# Patient Record
Sex: MALE | Race: WHITE | NOT HISPANIC OR LATINO | Employment: UNEMPLOYED | URBAN - NONMETROPOLITAN AREA
[De-identification: names, ages, dates, MRNs, and addresses within clinical notes are randomized per-mention and may not be internally consistent; named-entity substitution may affect disease eponyms.]

---

## 2019-02-09 ENCOUNTER — APPOINTMENT (OUTPATIENT)
Dept: GENERAL RADIOLOGY | Facility: HOSPITAL | Age: 44
End: 2019-02-09

## 2019-02-09 ENCOUNTER — HOSPITAL ENCOUNTER (EMERGENCY)
Facility: HOSPITAL | Age: 44
Discharge: HOME | End: 2019-02-09
Attending: EMERGENCY MEDICINE | Admitting: EMERGENCY MEDICINE

## 2019-02-09 VITALS
OXYGEN SATURATION: 97 % | BODY MASS INDEX: 24.15 KG/M2 | SYSTOLIC BLOOD PRESSURE: 100 MMHG | HEART RATE: 116 BPM | TEMPERATURE: 98.7 F | HEIGHT: 70 IN | DIASTOLIC BLOOD PRESSURE: 87 MMHG | WEIGHT: 168.7 LBS | RESPIRATION RATE: 19 BRPM

## 2019-02-09 DIAGNOSIS — F15.10 METHAMPHETAMINE ABUSE (HCC): Primary | ICD-10-CM

## 2019-02-09 LAB
ALBUMIN SERPL-MCNC: 5.2 G/DL (ref 3.5–5)
ALBUMIN/GLOB SERPL: 1.5 G/DL (ref 1.1–2.5)
ALP SERPL-CCNC: 88 U/L (ref 24–120)
ALT SERPL W P-5'-P-CCNC: 28 U/L (ref 0–54)
ANION GAP SERPL CALCULATED.3IONS-SCNC: 11 MMOL/L (ref 4–13)
AST SERPL-CCNC: 48 U/L (ref 7–45)
BASOPHILS # BLD AUTO: 0.05 10*3/MM3 (ref 0–0.2)
BASOPHILS NFR BLD AUTO: 0.3 % (ref 0–2)
BILIRUB SERPL-MCNC: 1.1 MG/DL (ref 0.1–1)
BUN BLD-MCNC: 25 MG/DL (ref 5–21)
BUN/CREAT SERPL: 22.7 (ref 7–25)
CALCIUM SPEC-SCNC: 9.7 MG/DL (ref 8.4–10.4)
CHLORIDE SERPL-SCNC: 102 MMOL/L (ref 98–110)
CO2 SERPL-SCNC: 27 MMOL/L (ref 24–31)
CREAT BLD-MCNC: 1.1 MG/DL (ref 0.5–1.4)
DEPRECATED RDW RBC AUTO: 38.4 FL (ref 40–54)
EOSINOPHIL # BLD AUTO: 0.04 10*3/MM3 (ref 0–0.7)
EOSINOPHIL NFR BLD AUTO: 0.3 % (ref 0–4)
ERYTHROCYTE [DISTWIDTH] IN BLOOD BY AUTOMATED COUNT: 12.2 % (ref 12–15)
GFR SERPL CREATININE-BSD FRML MDRD: 74 ML/MIN/1.73
GLOBULIN UR ELPH-MCNC: 3.5 GM/DL
GLUCOSE BLD-MCNC: 76 MG/DL (ref 70–100)
HCT VFR BLD AUTO: 40.3 % (ref 40–52)
HGB BLD-MCNC: 14.2 G/DL (ref 14–18)
IMM GRANULOCYTES # BLD AUTO: 0.04 10*3/MM3 (ref 0–0.03)
IMM GRANULOCYTES NFR BLD AUTO: 0.3 % (ref 0–5)
LYMPHOCYTES # BLD AUTO: 1.46 10*3/MM3 (ref 0.72–4.86)
LYMPHOCYTES NFR BLD AUTO: 9.1 % (ref 15–45)
MCH RBC QN AUTO: 30.4 PG (ref 28–32)
MCHC RBC AUTO-ENTMCNC: 35.2 G/DL (ref 33–36)
MCV RBC AUTO: 86.3 FL (ref 82–95)
MONOCYTES # BLD AUTO: 0.8 10*3/MM3 (ref 0.19–1.3)
MONOCYTES NFR BLD AUTO: 5 % (ref 4–12)
NEUTROPHILS # BLD AUTO: 13.59 10*3/MM3 (ref 1.87–8.4)
NEUTROPHILS NFR BLD AUTO: 85 % (ref 39–78)
NRBC BLD AUTO-RTO: 0 /100 WBC (ref 0–0)
PLATELET # BLD AUTO: 297 10*3/MM3 (ref 130–400)
PMV BLD AUTO: 9 FL (ref 6–12)
POTASSIUM BLD-SCNC: 4.3 MMOL/L (ref 3.5–5.3)
PROT SERPL-MCNC: 8.7 G/DL (ref 6.3–8.7)
RBC # BLD AUTO: 4.67 10*6/MM3 (ref 4.8–5.9)
SODIUM BLD-SCNC: 140 MMOL/L (ref 135–145)
TROPONIN I SERPL-MCNC: <0.012 NG/ML (ref 0–0.03)
WBC NRBC COR # BLD: 15.98 10*3/MM3 (ref 4.8–10.8)

## 2019-02-09 PROCEDURE — 93005 ELECTROCARDIOGRAM TRACING: CPT | Performed by: EMERGENCY MEDICINE

## 2019-02-09 PROCEDURE — 80053 COMPREHEN METABOLIC PANEL: CPT | Performed by: EMERGENCY MEDICINE

## 2019-02-09 PROCEDURE — 99284 EMERGENCY DEPT VISIT MOD MDM: CPT

## 2019-02-09 PROCEDURE — 84484 ASSAY OF TROPONIN QUANT: CPT | Performed by: EMERGENCY MEDICINE

## 2019-02-09 PROCEDURE — 85025 COMPLETE CBC W/AUTO DIFF WBC: CPT | Performed by: EMERGENCY MEDICINE

## 2019-02-09 PROCEDURE — 25010000002 LORAZEPAM PER 2 MG: Performed by: EMERGENCY MEDICINE

## 2019-02-09 PROCEDURE — 96374 THER/PROPH/DIAG INJ IV PUSH: CPT

## 2019-02-09 PROCEDURE — 96376 TX/PRO/DX INJ SAME DRUG ADON: CPT

## 2019-02-09 PROCEDURE — 93010 ELECTROCARDIOGRAM REPORT: CPT | Performed by: INTERNAL MEDICINE

## 2019-02-09 PROCEDURE — 73110 X-RAY EXAM OF WRIST: CPT

## 2019-02-09 RX ORDER — LORAZEPAM 2 MG/ML
1 INJECTION INTRAMUSCULAR ONCE
Status: COMPLETED | OUTPATIENT
Start: 2019-02-09 | End: 2019-02-09

## 2019-02-09 RX ORDER — SODIUM CHLORIDE 0.9 % (FLUSH) 0.9 %
10 SYRINGE (ML) INJECTION AS NEEDED
Status: DISCONTINUED | OUTPATIENT
Start: 2019-02-09 | End: 2019-02-09 | Stop reason: HOSPADM

## 2019-02-09 RX ADMIN — LORAZEPAM 1 MG: 2 INJECTION INTRAMUSCULAR at 15:33

## 2019-02-09 RX ADMIN — SODIUM CHLORIDE 1000 ML: 9 INJECTION, SOLUTION INTRAVENOUS at 17:44

## 2019-02-09 RX ADMIN — LORAZEPAM 1 MG: 2 INJECTION INTRAMUSCULAR at 16:50

## 2019-02-09 RX ADMIN — SODIUM CHLORIDE 1000 ML: 9 INJECTION, SOLUTION INTRAVENOUS at 15:32

## 2019-02-09 NOTE — ED PROVIDER NOTES
Subjective   Patient is a 49-year-old male who presents for medical clearance by law enforcement.  Patient was picked up with his girlfriend for walking in traffic.  Patient admitted to 1 g of methamphetamine use about 2 hours ago.  Patient states he eats methamphetamine.  Denies any other drug or alcohol use.  Denies chest pain, shortness of breath.  He does endorse 5 months worth of left wrist pain.  Denies trauma.  No fevers.  Denies any other complaint.  He is cooperative in no acute distress.            Review of Systems   Constitutional: Negative for fever.   HENT: Negative for sore throat.    Eyes: Negative for visual disturbance.   Respiratory: Negative for shortness of breath.    Cardiovascular: Negative for chest pain.   Gastrointestinal: Negative for abdominal pain.   Genitourinary: Negative for hematuria.   Musculoskeletal: Negative for back pain.   Skin: Negative for rash.   Neurological: Negative for headaches.   Psychiatric/Behavioral: Negative for agitation, confusion, hallucinations, self-injury, sleep disturbance and suicidal ideas. The patient is not nervous/anxious.        History reviewed. No pertinent past medical history.    No Known Allergies    History reviewed. No pertinent surgical history.    History reviewed. No pertinent family history.    Social History     Socioeconomic History   • Marital status:      Spouse name: Not on file   • Number of children: Not on file   • Years of education: Not on file   • Highest education level: Not on file   Tobacco Use   • Smoking status: Never Smoker   Substance and Sexual Activity   • Alcohol use: Yes     Comment: occassional beer   • Drug use: Yes     Types: Methamphetamines     Comment: meth today - ingestion   • Sexual activity: Defer       Lab Results (last 24 hours)     Procedure Component Value Units Date/Time    CBC & Differential [[id number]] Collected:  02/09/19 1530    Specimen:  Blood Updated:  02/09/19 1550    Narrative:       The  following orders were created for panel order CBC & Differential.  Procedure                               Abnormality         Status                     ---------                               -----------         ------                     CBC Auto Differential[[id number]]        Abnormal            Final result                 Please view results for these tests on the individual orders.    Comprehensive Metabolic Panel [[id number]]  (Abnormal) Collected:  02/09/19 1530    Specimen:  Blood from Arm, Left Updated:  02/09/19 1558     Glucose 76 mg/dL      BUN 25 mg/dL      Creatinine 1.10 mg/dL      Sodium 140 mmol/L      Potassium 4.3 mmol/L      Chloride 102 mmol/L      CO2 27.0 mmol/L      Calcium 9.7 mg/dL      Total Protein 8.7 g/dL      Albumin 5.20 g/dL      ALT (SGPT) 28 U/L      AST (SGOT) 48 U/L      Alkaline Phosphatase 88 U/L      Total Bilirubin 1.1 mg/dL      eGFR Non African Amer 74 mL/min/1.73      Globulin 3.5 gm/dL      A/G Ratio 1.5 g/dL      BUN/Creatinine Ratio 22.7     Anion Gap 11.0 mmol/L     Troponin [[id number]]  (Normal) Collected:  02/09/19 1530    Specimen:  Blood from Arm, Left Updated:  02/09/19 1608     Troponin I <0.012 ng/mL     CBC Auto Differential [[id number]]  (Abnormal) Collected:  02/09/19 1530    Specimen:  Blood from Arm, Left Updated:  02/09/19 1550     WBC 15.98 10*3/mm3      RBC 4.67 10*6/mm3      Hemoglobin 14.2 g/dL      Hematocrit 40.3 %      MCV 86.3 fL      MCH 30.4 pg      MCHC 35.2 g/dL      RDW 12.2 %      RDW-SD 38.4 fl      MPV 9.0 fL      Platelets 297 10*3/mm3      Neutrophil % 85.0 %      Lymphocyte % 9.1 %      Monocyte % 5.0 %      Eosinophil % 0.3 %      Basophil % 0.3 %      Immature Grans % 0.3 %      Neutrophils, Absolute 13.59 10*3/mm3      Lymphocytes, Absolute 1.46 10*3/mm3      Monocytes, Absolute 0.80 10*3/mm3      Eosinophils, Absolute 0.04 10*3/mm3      Basophils, Absolute 0.05 10*3/mm3      Immature Grans, Absolute 0.04 10*3/mm3      nRBC 0.0 /100  "WBC           Objective   Physical Exam   Constitutional: He is oriented to person, place, and time. He appears well-developed and well-nourished. He is cooperative.  Non-toxic appearance. He does not appear ill. No distress.   HENT:   Head: Atraumatic.   Mouth/Throat: Oropharynx is clear and moist and mucous membranes are normal.   Eyes: EOM are normal. Pupils are equal, round, and reactive to light.   Neck: Normal range of motion. Neck supple.   Cardiovascular: Regular rhythm, normal heart sounds and intact distal pulses. Tachycardia present. Exam reveals no gallop and no friction rub.   No murmur heard.  Pulmonary/Chest: Effort normal and breath sounds normal. No respiratory distress. He has no wheezes. He has no rhonchi. He has no rales. He exhibits no tenderness.   Abdominal: Soft. There is no tenderness.   Neurological: He is alert and oriented to person, place, and time. No cranial nerve deficit.   Skin: Skin is warm and dry. Capillary refill takes less than 2 seconds. He is not diaphoretic.   Psychiatric: He has a normal mood and affect. His speech is normal and behavior is normal. Thought content normal. He is not actively hallucinating.   Nursing note and vitals reviewed.      Procedures         XR Wrist 3+ View Left   Final Result   . Essentially normal exam of the left wrist with no evidence   of fracture or dislocation.   This report was finalized on 02/09/2019 15:33 by Dr. Hal Heck MD.          /73   Pulse 115   Temp 98.7 °F (37.1 °C)   Resp 18   Ht 177.8 cm (70\")   Wt 76.5 kg (168 lb 11.2 oz)   SpO2 97%   BMI 24.21 kg/m²     ED Course    ED Course as of Feb 09 1820   Sat Feb 09, 2019   1516 EKG sinus tachycardia.  Rate 127.  MA interval 142, QRS 82, QTc 450.  No elevation in aVR.  [TH]   1602 This is a 43-year-old male with methamphetamine abuse.  Patient arrives tachycardic by law enforcement.  Patient well-appearing.  No SI, HI, hallucinations.  His blood work does show " leukocytosis but likely reactive.  Labs otherwise benign.  Patient received Ativan.  His EKG was sinus tachycardia.  Chronic left wrist pain but x-ray negative.  Also gave patient 1 L of fluids.  [TH]   1738 Patient continued to be tachycardic therefore gave additional dose of Ativan.  [TH]   1812 Heart rate improved to 112.  Patient is requesting to go home and we will discharge patient home.  He is not altered, he does have the capacity to make decisions.  Tachycardia consistent with methamphetamine abuse.  Discussed with patient discontinuing drugs.  [TH]   1818 Apparently law enforcement is not going to arrest him.  We will discharge him home.  [TH]      ED Course User Index  [TH] Mumtaz Izquierdo MD       Medications   sodium chloride 0.9 % flush 10 mL (not administered)   sodium chloride 0.9 % bolus 1,000 mL (0 mL Intravenous Stopped 2/9/19 1547)   LORazepam (ATIVAN) injection 1 mg (1 mg Intravenous Given 2/9/19 1533)   LORazepam (ATIVAN) injection 1 mg (1 mg Intravenous Given 2/9/19 1650)   sodium chloride 0.9 % bolus 1,000 mL (1,000 mL Intravenous New Bag 2/9/19 1744)            MDM  Number of Diagnoses or Management Options  Methamphetamine abuse (CMS/HCC): new and requires workup     Amount and/or Complexity of Data Reviewed  Clinical lab tests: ordered and reviewed    Risk of Complications, Morbidity, and/or Mortality  Presenting problems: moderate  Diagnostic procedures: low  Management options: low    Patient Progress  Patient progress: improved      Final diagnoses:   Methamphetamine abuse (CMS/HCC)          Mumtaz Izquierdo MD  02/09/19 8161

## 2019-12-19 ENCOUNTER — OFFICE VISIT (OUTPATIENT)
Dept: GASTROENTEROLOGY | Facility: CLINIC | Age: 44
End: 2019-12-19

## 2019-12-19 VITALS
BODY MASS INDEX: 23.62 KG/M2 | WEIGHT: 165 LBS | DIASTOLIC BLOOD PRESSURE: 70 MMHG | HEART RATE: 66 BPM | HEIGHT: 70 IN | OXYGEN SATURATION: 99 % | TEMPERATURE: 98 F | SYSTOLIC BLOOD PRESSURE: 120 MMHG

## 2019-12-19 DIAGNOSIS — K62.5 RECTAL BLEED: Primary | ICD-10-CM

## 2019-12-19 DIAGNOSIS — K21.9 GASTROESOPHAGEAL REFLUX DISEASE, ESOPHAGITIS PRESENCE NOT SPECIFIED: ICD-10-CM

## 2019-12-19 PROCEDURE — 99204 OFFICE O/P NEW MOD 45 MIN: CPT | Performed by: NURSE PRACTITIONER

## 2019-12-19 NOTE — PROGRESS NOTES
Chief Complaint   Patient presents with   • Colonoscopy     having dark stools been having several years never had endo       PCP: Angelia Urena APRN  REFER: Angelia Urena APRN    Subjective     HPI    Patient referred to office for complain of dark stools recurrent for over five years.  Bowels move on daily basis.  He has observed bright red blood per rectum.  No diarrhea or constipation.  No abdominal pain.  No nausea/vomitting.  Occasional heartburn and indigestion that has been recurrent x 5 years.  Denies frequent use of NSAIDs.  No weight loss.  Frequent emesis.   He is unable to identify triggering factor to emesis.  No dysphagia.  No previous colonoscopy or egd.     Past Medical History:   Diagnosis Date   • Depression        History reviewed. No pertinent surgical history.    Outpatient Medications Marked as Taking for the 12/19/19 encounter (Office Visit) with Ganesh Rosenberg APRN   Medication Sig Dispense Refill   • sertraline (ZOLOFT) 50 MG tablet Take 50 mg by mouth Daily.         No Known Allergies    Social History     Socioeconomic History   • Marital status:      Spouse name: Not on file   • Number of children: Not on file   • Years of education: Not on file   • Highest education level: Not on file   Tobacco Use   • Smoking status: Never Smoker   • Smokeless tobacco: Never Used   Substance and Sexual Activity   • Alcohol use: Yes     Comment: occassional beer   • Drug use: Not Currently     Comment: meth today - ingestion   • Sexual activity: Defer       Family History   Family history unknown: Yes       Review of Systems   Constitutional: Negative for fatigue, fever and unexpected weight change.   HENT: Negative for hearing loss, sore throat and voice change.    Eyes: Negative for visual disturbance.   Respiratory: Negative for cough, shortness of breath and wheezing.    Cardiovascular: Negative for chest pain and palpitations.   Gastrointestinal: Positive for anal  "bleeding and vomiting. Negative for abdominal pain and blood in stool.   Endocrine: Negative for polydipsia and polyuria.   Genitourinary: Negative for difficulty urinating, dysuria, hematuria and urgency.   Musculoskeletal: Negative for joint swelling and myalgias.   Skin: Negative for color change, rash and wound.   Neurological: Negative for dizziness, tremors, seizures and syncope.   Hematological: Does not bruise/bleed easily.   Psychiatric/Behavioral: Negative for agitation and confusion. The patient is not nervous/anxious.        Objective     Vitals:    12/19/19 0914   BP: 120/70   Pulse: 66   Temp: 98 °F (36.7 °C)   SpO2: 99%   Weight: 74.8 kg (165 lb)   Height: 177.8 cm (70\")     Body mass index is 23.68 kg/m².    Physical Exam   Constitutional: He is oriented to person, place, and time. He appears well-developed and well-nourished. He is cooperative.   HENT:   Head: Normocephalic and atraumatic.   Eyes: Pupils are equal, round, and reactive to light. Conjunctivae are normal. No scleral icterus.   Neck: Normal range of motion. Neck supple. No JVD present. No thyroid mass and no thyromegaly present.   Cardiovascular: Normal rate, regular rhythm and normal heart sounds. Exam reveals no gallop and no friction rub.   No murmur heard.  Pulmonary/Chest: Effort normal and breath sounds normal. No accessory muscle usage. No respiratory distress. He has no wheezes. He has no rales.   Abdominal: Soft. Normal appearance and bowel sounds are normal. He exhibits no distension, no ascites and no mass. There is no hepatosplenomegaly. There is no tenderness. There is no rebound and no guarding.   Musculoskeletal: Normal range of motion. He exhibits no edema or tenderness.     Vascular Status -  His right foot exhibits normal foot vasculature  and no edema. His left foot exhibits normal foot vasculature  and no edema.  Lymphadenopathy:     He has no cervical adenopathy.   Neurological: He is alert and oriented to person, " place, and time. He has normal strength. Gait normal.   Skin: Skin is warm, dry and intact. No rash noted.       Imaging Results (Most Recent)     None          Body mass index is 23.68 kg/m².    Assessment/Plan     Dayton was seen today for colonoscopy.    Diagnoses and all orders for this visit:    Rectal bleed  -     Case Request; Standing  -     Case Request    Gastroesophageal reflux disease, esophagitis presence not specified  -     Case Request; Standing  -     Case Request        ESOPHAGOGASTRODUODENOSCOPY WITH ANESTHESIA (N/A)  2. COLONOSCOPY WITH ANESTHESIA    The risk of the endoscopy were discussed in detail.  We discussed the risk of perforation (one out of 3848-5247, riskier with dilation), bleeding (one out of 500), and the rare risks of infection, adverse reaction to anesthesia, respiratory failure, cardiac failure including MI and adverse reaction to medications, etc.  We discussed consequences that could occur if a risk were to develop such as the need for hospitalization, blood transfusion, surgical intervention, medications, pain and disability and death.  Alternatives include not doing anything, or pursuing an UGI series which only offers a diagnosis with potential less accuracy compared to egd.  The patient verbalizes understanding and agrees to proceed.      All risks, benefits, alternatives, and indications of colonoscopy procedure have been discussed with the patient. Risks to include perforation of the colon requiring possible surgery or colostomy, risk of bleeding from biopsies or removal of colon tissue, possibility of missing a colon polyp or cancer, or adverse drug reaction.  Benefits to include the diagnosis and management of disease of the colon and rectum. Alternatives to include barium enema, radiographic evaluation, lab testing or no intervention. Pt verbalizes understanding and agrees to proceed with procedure.        There are no Patient Instructions on file for this visit.

## 2019-12-23 PROBLEM — K21.9 GASTROESOPHAGEAL REFLUX DISEASE: Status: ACTIVE | Noted: 2019-12-23

## 2020-01-02 ENCOUNTER — TELEPHONE (OUTPATIENT)
Dept: GASTROENTEROLOGY | Facility: HOSPITAL | Age: 45
End: 2020-01-02

## 2020-01-02 NOTE — TELEPHONE ENCOUNTER
Left message on patient cell phone. Sent letter to pcp.      Repeat urine specimen obtained, bright red blood noted in previous urine sample no longer noted, however urine specimen still appears dark.

## 2020-01-07 ENCOUNTER — TELEPHONE (OUTPATIENT)
Dept: GASTROENTEROLOGY | Facility: HOSPITAL | Age: 45
End: 2020-01-07

## 2020-01-07 NOTE — TELEPHONE ENCOUNTER
Sent letter pcp and left message for patient to contact the office to make appointment with Dr. Swanson.     Thank you

## 2020-07-30 ENCOUNTER — TELEMEDICINE (OUTPATIENT)
Dept: GASTROENTEROLOGY | Facility: CLINIC | Age: 45
End: 2020-07-30

## 2020-07-30 DIAGNOSIS — R19.8 ALTERED BOWEL FUNCTION: ICD-10-CM

## 2020-07-30 DIAGNOSIS — K62.5 RECTAL BLEED: Primary | ICD-10-CM

## 2020-07-30 PROCEDURE — 99213 OFFICE O/P EST LOW 20 MIN: CPT | Performed by: NURSE PRACTITIONER

## 2020-07-30 NOTE — PROGRESS NOTES
Chief Complaint   Patient presents with   • Colonoscopy     needs colon       PCP: Angelia Urena APRN  REFER: No ref. provider found    Subjective     HPI    VIDEO VISIT:    Patient complain of fecal leakage.  This has been occurring on intermittent basis for over one year.  He does experience fecal urgency within 30 min of eating.  Urgency not always associated with  greasy food.  Abdominal cramping present  prior to having a BM.  No constipation.  Occasional bright red blood, no melena.  Blood not related to hard stool or diarrheal type stool.  Denies frequent use of NSAIDs.  No weight loss.  No previous colonoscopy.     Past Medical History:   Diagnosis Date   • Depression        History reviewed. No pertinent surgical history.    No outpatient medications have been marked as taking for the 7/30/20 encounter (Telemedicine) with Ganesh Rosenberg APRN.       No Known Allergies    Social History     Socioeconomic History   • Marital status:      Spouse name: Not on file   • Number of children: Not on file   • Years of education: Not on file   • Highest education level: Not on file   Tobacco Use   • Smoking status: Never Smoker   • Smokeless tobacco: Never Used   Substance and Sexual Activity   • Alcohol use: Not Currently   • Drug use: Not Currently     Comment: meth today - ingestion   • Sexual activity: Defer       Family History   Problem Relation Age of Onset   • Colon cancer Neg Hx    • Colon polyps Neg Hx    • Esophageal cancer Neg Hx        Review of Systems   Constitutional: Negative for fatigue, fever and unexpected weight change.   HENT: Negative for hearing loss, sore throat and voice change.    Eyes: Negative for visual disturbance.   Respiratory: Negative for cough, shortness of breath and wheezing.    Cardiovascular: Negative for chest pain and palpitations.   Gastrointestinal: Positive for anal bleeding. Negative for abdominal pain, blood in stool and vomiting.   Endocrine: Negative  for polydipsia and polyuria.   Genitourinary: Negative for difficulty urinating, dysuria, hematuria and urgency.   Musculoskeletal: Negative for joint swelling and myalgias.   Skin: Negative for color change, rash and wound.   Neurological: Negative for dizziness, tremors, seizures and syncope.   Hematological: Does not bruise/bleed easily.   Psychiatric/Behavioral: Negative for agitation and confusion. The patient is not nervous/anxious.        Objective     There were no vitals filed for this visit.  There is no height or weight on file to calculate BMI.    Physical Exam   Constitutional: He appears well-developed and well-nourished.   HENT:   Head: Atraumatic.   Eyes: Conjunctivae and EOM are normal. Right eye exhibits no discharge. Left eye exhibits no discharge. No scleral icterus.   Neck: Neck normal appearance.  Pulmonary/Chest: Effort normal.   Abdominal: Abdomen appears normal.   Musculoskeletal: Normal range of motion.   Neurological: He is alert.   Skin: Skin is dry. No pallor.   Psychiatric: He has a normal mood and affect.       Imaging Results (Most Recent)     None          There is no height or weight on file to calculate BMI.    Assessment/Plan     Dayton was seen today for colonoscopy.    Diagnoses and all orders for this visit:    Rectal bleed    Altered bowel function  -     Case Request; Standing  -     Case Request    Other orders  -     polyethylene glycol (GoLYTELY) 236 g solution; Take 3,785 mL by mouth 1 (One) Time for 1 dose. Take as directed        COLONOSCOPY WITH ANESTHESIA (N/A)    Decrease caffeine intake      All risks, benefits, alternatives, and indications of colonoscopy procedure have been discussed with the patient. Risks to include perforation of the colon requiring possible surgery or colostomy, risk of bleeding from biopsies or removal of colon tissue, possibility of missing a colon polyp or cancer, or adverse drug reaction.  Benefits to include the diagnosis and management of  disease of the colon and rectum. Alternatives to include barium enema, radiographic evaluation, lab testing or no intervention. Pt verbalizes understanding and agrees to proceed with procedure.      Precautions are currently being put in place due to COVID-19.  I have explained to Dayton Wheeler they will be required to undergo COVID testing prior to their procedure.  Dayton Wheeler verbalized understanding and was willing to proceed.      This was an audio and video enabled telemedicine encounter. This visit was conducted with me in my office and Dayton Wheeler at their home    Ganesh Rosenberg, APRN  07/30/20          There are no Patient Instructions on file for this visit.

## 2020-07-30 NOTE — PROGRESS NOTES
No chief complaint on file.      PCP: Angelia Urena APRN  REFER: No ref. provider found    Subjective     HPI        Past Medical History:   Diagnosis Date   • Depression        No past surgical history on file.    No outpatient medications have been marked as taking for the 7/30/20 encounter (Appointment) with Ganesh Rosenberg APRN.       No Known Allergies    Social History     Socioeconomic History   • Marital status:      Spouse name: Not on file   • Number of children: Not on file   • Years of education: Not on file   • Highest education level: Not on file   Tobacco Use   • Smoking status: Never Smoker   • Smokeless tobacco: Never Used   Substance and Sexual Activity   • Alcohol use: Yes     Comment: occassional beer   • Drug use: Not Currently     Comment: meth today - ingestion   • Sexual activity: Defer       Family History   Family history unknown: Yes       Review of Systems   Constitutional: Negative for fatigue, fever and unexpected weight change.   HENT: Negative for hearing loss, sore throat and voice change.    Eyes: Negative for visual disturbance.   Respiratory: Negative for cough, shortness of breath and wheezing.    Cardiovascular: Negative for chest pain and palpitations.   Gastrointestinal: Negative for abdominal pain, blood in stool and vomiting.   Endocrine: Negative for polydipsia and polyuria.   Genitourinary: Negative for difficulty urinating, dysuria, hematuria and urgency.   Musculoskeletal: Negative for joint swelling and myalgias.   Skin: Negative for color change, rash and wound.   Neurological: Negative for dizziness, tremors, seizures and syncope.   Hematological: Does not bruise/bleed easily.   Psychiatric/Behavioral: Negative for agitation and confusion. The patient is not nervous/anxious.        Objective     There were no vitals filed for this visit.  There is no height or weight on file to calculate BMI.    Physical Exam    Imaging Results (Most Recent)      None          There is no height or weight on file to calculate BMI.    Assessment/Plan     There are no diagnoses linked to this encounter.    * Surgery not found *        Precautions are currently being put in place due to COVID-19.  I have explained to Dayton Wheeler they will be required to undergo COVID testing prior to their procedure.  Dayton Wheeler verbalized understanding and was willing to proceed.    Patient's There is no height or weight on file to calculate BMI. BMI is {BMI range:25265}.       Ganesh Rosenberg, APRN  07/30/20          There are no Patient Instructions on file for this visit.

## 2020-07-31 ENCOUNTER — HOSPITAL ENCOUNTER (OUTPATIENT)
Facility: HOSPITAL | Age: 45
Setting detail: HOSPITAL OUTPATIENT SURGERY
End: 2020-07-31
Attending: INTERNAL MEDICINE | Admitting: INTERNAL MEDICINE

## 2020-07-31 PROBLEM — R19.8 ALTERED BOWEL FUNCTION: Status: ACTIVE | Noted: 2020-07-31

## 2020-08-03 ENCOUNTER — TRANSCRIBE ORDERS (OUTPATIENT)
Dept: ADMINISTRATIVE | Facility: HOSPITAL | Age: 45
End: 2020-08-03

## 2020-08-03 DIAGNOSIS — Z01.818 PRE-OP TESTING: Primary | ICD-10-CM

## 2020-08-10 ENCOUNTER — TELEPHONE (OUTPATIENT)
Dept: GASTROENTEROLOGY | Facility: CLINIC | Age: 45
End: 2020-08-10

## 2020-08-10 NOTE — TELEPHONE ENCOUNTER
Spoke with patient this morning - Did not go and get covid tested. Reschedule Colon for 08/24/2020 at 8:45am

## 2020-08-21 ENCOUNTER — LAB (OUTPATIENT)
Dept: LAB | Facility: HOSPITAL | Age: 45
End: 2020-08-21

## 2020-08-21 PROCEDURE — C9803 HOPD COVID-19 SPEC COLLECT: HCPCS | Performed by: INTERNAL MEDICINE

## 2020-08-21 PROCEDURE — U0003 INFECTIOUS AGENT DETECTION BY NUCLEIC ACID (DNA OR RNA); SEVERE ACUTE RESPIRATORY SYNDROME CORONAVIRUS 2 (SARS-COV-2) (CORONAVIRUS DISEASE [COVID-19]), AMPLIFIED PROBE TECHNIQUE, MAKING USE OF HIGH THROUGHPUT TECHNOLOGIES AS DESCRIBED BY CMS-2020-01-R: HCPCS | Performed by: INTERNAL MEDICINE

## 2020-08-22 LAB
COVID LABCORP PRIORITY: NORMAL
SARS-COV-2 RNA RESP QL NAA+PROBE: NOT DETECTED

## 2020-08-24 ENCOUNTER — PREP FOR SURGERY (OUTPATIENT)
Dept: OTHER | Facility: HOSPITAL | Age: 45
End: 2020-08-24

## 2020-08-24 ENCOUNTER — TELEPHONE (OUTPATIENT)
Dept: GASTROENTEROLOGY | Facility: CLINIC | Age: 45
End: 2020-08-24

## 2020-08-24 DIAGNOSIS — K62.5 RECTAL BLEED: Primary | ICD-10-CM

## 2020-08-25 ENCOUNTER — TELEPHONE (OUTPATIENT)
Dept: GASTROENTEROLOGY | Facility: CLINIC | Age: 45
End: 2020-08-25

## 2020-08-25 ENCOUNTER — TRANSCRIBE ORDERS (OUTPATIENT)
Dept: ADMINISTRATIVE | Facility: HOSPITAL | Age: 45
End: 2020-08-25

## 2020-08-25 DIAGNOSIS — Z01.818 PRE-OP TESTING: Primary | ICD-10-CM

## 2020-09-11 ENCOUNTER — TRANSCRIBE ORDERS (OUTPATIENT)
Dept: ADMINISTRATIVE | Facility: HOSPITAL | Age: 45
End: 2020-09-11

## 2020-09-11 DIAGNOSIS — Z01.818 PREOP TESTING: Primary | ICD-10-CM

## 2020-09-18 ENCOUNTER — TELEPHONE (OUTPATIENT)
Dept: GASTROENTEROLOGY | Facility: CLINIC | Age: 45
End: 2020-09-18

## 2020-09-18 NOTE — TELEPHONE ENCOUNTER
Called patient to confirm appointment for Monday. Patients wife stated he was sick and they have decided to wait till the first of the year to have procedure.     Explained to her that this was the THIRD time we have reschedule/ cxd without them calling the office. He will need an office visit with Dr. Swanson before I will reschedule procedure.    Thank you     Sent letter to Angelia Urena APRN

## 2022-01-01 PROCEDURE — U0004 COV-19 TEST NON-CDC HGH THRU: HCPCS | Performed by: NURSE PRACTITIONER

## 2022-01-19 ENCOUNTER — NURSE TRIAGE (OUTPATIENT)
Dept: CALL CENTER | Facility: HOSPITAL | Age: 47
End: 2022-01-19

## 2022-01-20 NOTE — TELEPHONE ENCOUNTER
"Right shoulder blade pain , hurts to take a deep breathe and does sweat, had taken Aleve.  Had covid 2 and half weeks ago.  Suggested the ED,stated might try to come. Also suggested heat    Reason for Disposition  • [1] Shoulder pains with exertion (e.g., walking) AND [2] pain goes away on resting AND [3] not present now    Additional Information  • Negative: Passed out (i.e., lost consciousness, collapsed and was not responding)  • Negative: Shock suspected (e.g., cold/pale/clammy skin, too weak to stand, low BP, rapid pulse)  • Negative: [1] Similar pain previously AND [2] it was from \"heart attack\"  • Negative: [1] Similar pain previously AND [2] it was from \"angina\" AND [3] not relieved by nitroglycerin  • Negative: Sounds like a life-threatening emergency to the triager  • Negative: Followed a shoulder injury  • Negative: Chest pain  • Negative: Difficulty breathing or unusual sweating (e.g., sweating without exertion)  • Negative: [1] Pain lasting > 5 minutes AND [2] pain also present in chest  (Exception: pain is clearly made worse by movement)  • Negative: [1] Age > 40 AND [2] no obvious cause AND [3] pain even when not moving the arm    (Exception: pain is clearly made worse by moving arm or bending neck)  • Negative: [1] SEVERE pain AND [2] not improved 2 hours after pain medicine  • Negative: [1] Red area or streak AND [2] fever  • Negative: [1] Swollen joint AND [2] fever  • Negative: Patient sounds very sick or weak to the triager  • Negative: Entire arm is swollen  • Negative: Weakness (i.e., loss of strength) in hand or fingers    (Exception: not truly weak; hand feels weak because of pain)    Answer Assessment - Initial Assessment Questions  1. ONSET: \"When did the pain start?\"  today  2. LOCATION: \"Where is the pain located?\"    Right shoulder to back  3. PAIN: \"How bad is the pain?\" (Scale 1-10; or mild, moderate, severe)    - MILD (1-3): doesn't interfere with normal activities    - MODERATE " "(4-7): interferes with normal activities (e.g., work or school) or awakens from sleep    - SEVERE (8-10): excruciating pain, unable to do any normal activities, unable to move arm at all due to pain      Takes breathe and hard to breath  4. WORK OR EXERCISE: \"Has there been any recent work or exercise that involved this part of the body?\"   no  5. CAUSE: \"What do you think is causing the shoulder pain?\"      unsure  6. OTHER SYMPTOMS: \"Do you have any other symptoms?\" (e.g., neck pain, swelling, rash, fever, numbness, weakness)      Recently had covid   7. PREGNANCY: \"Is there any chance you are pregnant?\" \"When was your last menstrual period?\"      na    Protocols used: SHOULDER PAIN-ADULT-AH      "

## 2022-08-09 ENCOUNTER — TRANSCRIBE ORDERS (OUTPATIENT)
Dept: ADMINISTRATIVE | Facility: HOSPITAL | Age: 47
End: 2022-08-09

## 2022-08-09 DIAGNOSIS — E04.1 THYROID NODULE: Primary | ICD-10-CM

## 2022-08-16 ENCOUNTER — HOSPITAL ENCOUNTER (OUTPATIENT)
Dept: ULTRASOUND IMAGING | Facility: HOSPITAL | Age: 47
Discharge: HOME | End: 2022-08-16
Admitting: NURSE PRACTITIONER

## 2022-08-16 PROCEDURE — 76536 US EXAM OF HEAD AND NECK: CPT

## 2022-11-14 ENCOUNTER — APPOINTMENT (OUTPATIENT)
Dept: CT IMAGING | Facility: HOSPITAL | Age: 47
End: 2022-11-14

## 2022-11-14 ENCOUNTER — HOSPITAL ENCOUNTER (EMERGENCY)
Facility: HOSPITAL | Age: 47
Discharge: HOME | End: 2022-11-14
Admitting: FAMILY MEDICINE

## 2022-11-14 VITALS
DIASTOLIC BLOOD PRESSURE: 113 MMHG | TEMPERATURE: 97.7 F | BODY MASS INDEX: 28.44 KG/M2 | HEART RATE: 81 BPM | OXYGEN SATURATION: 99 % | RESPIRATION RATE: 16 BRPM | WEIGHT: 192 LBS | SYSTOLIC BLOOD PRESSURE: 165 MMHG | HEIGHT: 69 IN

## 2022-11-14 DIAGNOSIS — M54.2 CERVICALGIA: Primary | ICD-10-CM

## 2022-11-14 PROCEDURE — 99282 EMERGENCY DEPT VISIT SF MDM: CPT

## 2022-11-14 PROCEDURE — 93010 ELECTROCARDIOGRAM REPORT: CPT | Performed by: INTERNAL MEDICINE

## 2022-11-14 PROCEDURE — 72125 CT NECK SPINE W/O DYE: CPT

## 2022-11-14 PROCEDURE — 93005 ELECTROCARDIOGRAM TRACING: CPT | Performed by: FAMILY MEDICINE

## 2022-11-14 PROCEDURE — 25010000002 KETOROLAC TROMETHAMINE PER 15 MG: Performed by: NURSE PRACTITIONER

## 2022-11-14 PROCEDURE — 25010000002 DEXAMETHASONE PER 1 MG: Performed by: NURSE PRACTITIONER

## 2022-11-14 PROCEDURE — 99283 EMERGENCY DEPT VISIT LOW MDM: CPT | Performed by: NURSE PRACTITIONER

## 2022-11-14 PROCEDURE — 96372 THER/PROPH/DIAG INJ SC/IM: CPT

## 2022-11-14 PROCEDURE — 25010000002 ORPHENADRINE CITRATE PER 60 MG: Performed by: NURSE PRACTITIONER

## 2022-11-14 RX ORDER — DEXAMETHASONE SODIUM PHOSPHATE 10 MG/ML
10 INJECTION INTRAMUSCULAR; INTRAVENOUS ONCE
Status: COMPLETED | OUTPATIENT
Start: 2022-11-14 | End: 2022-11-14

## 2022-11-14 RX ORDER — ORPHENADRINE CITRATE 30 MG/ML
60 INJECTION INTRAMUSCULAR; INTRAVENOUS ONCE
Status: COMPLETED | OUTPATIENT
Start: 2022-11-14 | End: 2022-11-14

## 2022-11-14 RX ORDER — KETOROLAC TROMETHAMINE 30 MG/ML
60 INJECTION, SOLUTION INTRAMUSCULAR; INTRAVENOUS ONCE
Status: COMPLETED | OUTPATIENT
Start: 2022-11-14 | End: 2022-11-14

## 2022-11-14 RX ADMIN — ORPHENADRINE CITRATE 60 MG: 30 INJECTION INTRAMUSCULAR; INTRAVENOUS at 14:55

## 2022-11-14 RX ADMIN — DEXAMETHASONE SODIUM PHOSPHATE 10 MG: 10 INJECTION INTRAMUSCULAR; INTRAVENOUS at 14:55

## 2022-11-14 RX ADMIN — KETOROLAC TROMETHAMINE 60 MG: 30 INJECTION, SOLUTION INTRAMUSCULAR at 14:55

## 2022-11-14 NOTE — ED PROVIDER NOTES
Subjective   History of Present Illness  47 yom presents with c/o neck pain x 4 days.  He reports the pain radiates down both arms causing numbness in the hands, particularly the left hand.  He is c/o swelling in the left hand as well as decreased strength.  He denies fever.  He denies injury.  He denies recent spinal injections.  He denies CP or SOB.  No n/v/d.  No dizziness.  He states he has been taking ibuprofen with little improvement.        Review of Systems   Constitutional: Negative for activity change, appetite change, fatigue and fever.   HENT: Negative for congestion, ear pain, facial swelling and sore throat.    Eyes: Negative for discharge and visual disturbance.   Respiratory: Negative for apnea, chest tightness, shortness of breath, wheezing and stridor.    Cardiovascular: Negative for chest pain and palpitations.   Gastrointestinal: Negative for abdominal distention, abdominal pain, diarrhea, nausea and vomiting.   Genitourinary: Negative for difficulty urinating and dysuria.   Musculoskeletal: Positive for neck pain. Negative for arthralgias and myalgias.   Skin: Negative for rash and wound.   Neurological: Positive for numbness (LUE). Negative for dizziness and seizures.   Psychiatric/Behavioral: Negative for agitation and confusion.       Past Medical History:   Diagnosis Date   • Depression        No Known Allergies    History reviewed. No pertinent surgical history.    Family History   Problem Relation Age of Onset   • Colon cancer Neg Hx    • Colon polyps Neg Hx    • Esophageal cancer Neg Hx        Social History     Socioeconomic History   • Marital status:    Tobacco Use   • Smoking status: Never   • Smokeless tobacco: Never   Substance and Sexual Activity   • Alcohol use: Yes     Comment: seldom   • Drug use: Not Currently     Comment: hx meth use   • Sexual activity: Defer           Objective   Physical Exam  Vitals and nursing note reviewed.   Constitutional:       Appearance: He  is well-developed.   HENT:      Head: Normocephalic.   Eyes:      Pupils: Pupils are equal, round, and reactive to light.   Cardiovascular:      Rate and Rhythm: Normal rate and regular rhythm.      Heart sounds: No murmur heard.  Pulmonary:      Effort: Pulmonary effort is normal.      Breath sounds: Normal breath sounds.   Abdominal:      General: Bowel sounds are normal.      Palpations: Abdomen is soft.   Musculoskeletal:        Hands:       Cervical back: Neck supple. No torticollis or tenderness. Decreased range of motion.      Comments: Swelling present to the dorsal side of the left hand.  The LUE is pink, warm and dry with +PMS   Skin:     General: Skin is warm and dry.   Neurological:      Mental Status: He is alert and oriented to person, place, and time.      GCS: GCS eye subscore is 4. GCS verbal subscore is 5. GCS motor subscore is 6.      Comments: Decreased  of the left hand.           Procedures           ED Course  ED Course as of 11/16/22 0119   Mon Nov 14, 2022   1531 CT cervical spine - IMPRESSION: 1. No acute fracture. A corticated bone fragment involving the facet joint on the left at C3-4 may be an old injury. Straightening on the sagittal images is likely positional. Minimal anterior subluxation of C3 compared to C4 is felt to be degenerative. 2. Degenerative changes at multiple levels. Please see the above report. 3. There is mild prominence of nasopharyngeal and oropharyngeal lymphoid tissues. This is symmetric and likely reactive. There are no pathologically enlarged lymph nodes.  I reviewed his CT scan with RAJAN BRENNER with neurosurgery.  They will consult on the patient.   The patient is aware of consult.  [KS]   1620 The patient states he does not want to wait.  He is signing out AMA.  He remains alert and oriented x 3. [KS]   1629 He has now decided to stay and be evaluated by neurosurgery  [KS]   Tue Nov 15, 2022   1700 He has been evaluated by JORDIN Reina.  He has a plan of  care in place.  He voiced understanding of all results and instructions [KS]      ED Course User Index  [KS] Concha Dave APRN                                           [facility]    Final diagnoses:   Cervicalgia       ED Disposition  ED Disposition     ED Disposition   Discharge    Condition   Stable    Comment   --             Dayton Gallego, APRN  [address] [id number] [phone]    Follow up in 8 week(s)  For reassessment after vompletion of PT    Angelia Urena, APRN  [address] [facility] [id number] [phone]    Call in 1 day  Routine ED follow up         Medication List      No changes were made to your prescriptions during this visit.         Concha Dave, JORDIN  11/16/22 012

## 2022-11-14 NOTE — DISCHARGE INSTRUCTIONS
Increase fluids.  Physical therapy and other instructions as given by neurosurgery.  Follow up with neurosurgery in 8 weeks as discussed.  Follow up with PCP - call tomorrow for appointment. Return to ED if condition does not improve or worsens

## 2022-11-14 NOTE — CONSULTS
NEUROSURGERY INITIAL HOSPITAL ENCOUNTER    Assessment/Plan:   Dayton Wheeler is a 47 y.o. male with a significant medical history of depression and is overweight.  He presents today with a new problem of intermittent flareups of neck pain and bilateral upper extremity numbness and tingling.  Physical exam findings of dexterity is well-maintained, decreased range of motion and pain with both internal and external rotation of the bilateral shoulders, giveaway weakness to the bilateral deltoids that improves with coaching, and positive Tinel's over the left median nerve.  No evidence of pathologic reflexes.  Their imaging shows no acute abnormalities, chronic appearing fracture to the left facet C3-4, and spondylosis at C5-6.    Differential Diagnosis:   Differential diagnosis include, but are not limited to Cervical Degenerative Disc Disease, Cervical Spinal Stenosis, Bilateral Cervical Radiculopathy, Chronic Neck Pain, peripheral neuropathy, and shoulder pathology.    Recommendations:  Mr. Wheeler's imaging, CT of the cervical spine was reviewed and show multilevel degenerative changes without acute abnormalities. No neurosurgical intervention warranted at this time.  Mr. Wheeler is ready to discharge from neurosurgical perspective.  We will have Dayton follow-up in the neurosurgical clinic on an outpatient basis in 6-8 weeks for reassessment after a trial of conservative management as follows:    - Bedrest for 2-3 days maximum  - Activity modification.  Temporary limit 1) lifting > 8 lbs, 2) avoid asymmetric postures. 3) bending or twisting  - PT/OT.  Rx provided for 6 weeks of PT/OT focusing of traction, nerve root decompression and core strengthening.  - APAP 1000mg q6hrs scheduled for 2 weeks, if no history of EtOH abuse or liver failure  x Oral steroids have no benefit over placebo    Dr. Celis reviewed all images and agree with these recommendations.    I discussed the patients findings and my recommendations with  patient, family and consulting provider    Thank you very much for this interesting consult.     Level of Risk: High due to: abrupt change in neurological status  MDM: High  Mod = 38466, Glor=[id number]  ___________________________________________________________________    Reason for consult: Neck pain  Consultation at the request of ED provider, Concha BRENNER    Chief Complaint:   Chief Complaint   Patient presents with   • Neck Pain   • Numbness     HPI: Dayton Wheeler is a 47 y.o. male with a significant medical history of depression and is overweight.  He presents today with a new problem of intermittent flareups of neck pain and bilateral upper extremity numbness and tingling.  No previous spine surgeries.     Intermittent flareups of neck pain over the past 4-5 years.  His most recent flareup occurred approximately 4 days ago.  He denies injury.  He currently complains of bilateral shoulder pain that radiates to his neck, at worst, he additionally reports left elbow pain, as well as numbness and tingling to digits 2-4 on the right and all digits on the left.  His discomfort is most notable upon waking and with exposure to cold.  Alleviating factors include intermittent use of ibuprofen.  He denies fevers, chills, night sweats, unexplained weight loss, gait or balance abnormalities, falls, saddle anesthesia, or bowel bladder dysfunction.  He currently rates the severity of his symptoms 8/10.  No additional concerns at this time.    Review of Systems   Constitutional: Negative.  Negative for chills, fever and unexpected weight change.   HENT: Negative.    Eyes: Negative.    Respiratory: Negative.    Cardiovascular: Negative.    Gastrointestinal: Negative.    Endocrine: Negative.    Musculoskeletal: Positive for neck pain and neck stiffness.   Allergic/Immunologic: Negative.    Neurological: Positive for numbness.   Hematological: Negative.    Psychiatric/Behavioral: Negative.    All other systems reviewed and  are negative.     Past Medical History:  has a past medical history of Depression.    Past Surgical History:  has no past surgical history on file.    Family History: family history is not on file.    Social History:  reports that he has never smoked. He has never used smokeless tobacco. He reports current alcohol use. He reports that he does not currently use drugs.    Allergies: Patient has no known allergies.    Home Medications: No current facility-administered medications for this encounter.  No current outpatient medications on file.    Medications: Scheduled Meds:  Continuous Infusions:No current facility-administered medications for this encounter.    PRN Meds:.    Vital Signs  Temp:  [97.7 °F (36.5 °C)] 97.7 °F (36.5 °C)  Heart Rate:  [81] 81  Resp:  [16] 16  BP: (165)/(113) 165/113    Physical Exam  Physical Exam  Vitals and nursing note reviewed.   Constitutional:       General: He is not in acute distress.     Appearance: Normal appearance. He is well-developed, well-groomed and overweight. He is not ill-appearing, toxic-appearing or diaphoretic.      Comments: BMI 28.35   HENT:      Head: Normocephalic and atraumatic.      Right Ear: Hearing normal.      Left Ear: Hearing normal.   Eyes:      Extraocular Movements: EOM normal.      Conjunctiva/sclera: Conjunctivae normal.      Pupils: Pupils are equal, round, and reactive to light.   Neck:      Trachea: Trachea normal.   Cardiovascular:      Rate and Rhythm: Normal rate and regular rhythm.   Pulmonary:      Effort: Pulmonary effort is normal. No tachypnea, bradypnea, accessory muscle usage or respiratory distress.   Abdominal:      Palpations: Abdomen is soft.   Musculoskeletal:      Right shoulder: Decreased range of motion.      Left shoulder: Decreased range of motion.      Cervical back: Full passive range of motion without pain and neck supple.      Comments: Increased pain with both internal and external rotation of the bilateral shoulders    Skin:     General: Skin is warm and dry.   Neurological:      Mental Status: He is alert and oriented to person, place, and time.      GCS: GCS eye subscore is 4. GCS verbal subscore is 5. GCS motor subscore is 6.      Gait: Gait is intact.      Deep Tendon Reflexes:      Reflex Scores:       Tricep reflexes are 2+ on the right side and 2+ on the left side.       Bicep reflexes are 2+ on the right side and 2+ on the left side.       Brachioradialis reflexes are 2+ on the right side and 2+ on the left side.       Patellar reflexes are 2+ on the right side and 2+ on the left side.       Achilles reflexes are 2+ on the right side and 2+ on the left side.  Psychiatric:         Speech: Speech normal.         Behavior: Behavior normal. Behavior is cooperative.       Neurologic Exam     Mental Status   Oriented to person, place, and time.   Attention: normal. Concentration: normal.   Speech: speech is normal   Level of consciousness: alert    Cranial Nerves     CN II   Visual fields full to confrontation.     CN III, IV, VI   Pupils are equal, round, and reactive to light.  Extraocular motions are normal.     CN V   Facial sensation intact.     CN VII   Facial expression full, symmetric.     CN VIII   CN VIII normal.     CN IX, X   CN IX normal.     CN XI   CN XI normal.     Motor Exam   Right arm tone: normal  Left arm tone: normal  Right leg tone: normal  Left leg tone: normal    Strength   Right deltoid: 5/5  Left deltoid: 5/5  Right biceps: 5/5  Left biceps: 5/5  Right triceps: 5/5  Left triceps: 5/5  Right wrist extension: 5/5  Left wrist extension: 5/5  Right iliopsoas: 5/5  Left iliopsoas: 5/5  Right quadriceps: 5/5  Left quadriceps: 5/5  Right anterior tibial: 5/5  Left anterior tibial: 5/5  Right gastroc: 5/5  Left gastroc: 5/5  Giveaway weakness of the bilateral deltoids that improves with coaching  Right EHL 5/5  Left EHL 5/5       Sensory Exam   Right arm light touch: normal  Left arm light touch:  normal  Right leg light touch: normal  Left leg light touch: normal    Gait, Coordination, and Reflexes     Gait  Gait: normal    Tremor   Resting tremor: absent  Intention tremor: absent  Action tremor: absent    Reflexes   Right brachioradialis: 2+  Left brachioradialis: 2+  Right biceps: 2+  Left biceps: 2+  Right triceps: 2+  Left triceps: 2+  Right patellar: 2+  Left patellar: 2+  Right achilles: 2+  Left achilles: 2+  Right plantar: normal  Left plantar: normal  Right Brand: absent  Left Brand: absent  Right ankle clonus: absent  Left ankle clonus: absent  Right pendular knee jerk: absent  Left pendular knee jerk: absent    Results Review:   Independent review and interpretation of imaging  Imaging Results (Last 24 Hours)     Procedure Component Value Units Date/Time    CT Cervical Spine Without Contrast [[id number]] Collected: 11/14/22 1500     Updated: 11/14/22 1509    Narrative:      EXAMINATION:  CT CERVICAL SPINE WO CONTRAST-  11/14/2022 2:35 PM CST     HISTORY: Cervical radiculopathy, no red flags. Left-sided neck pain  radiating to the left shoulder.     COMPARISON: No comparison.      DOSE LENGTH PRODUCT: 247 mGy-cm. Automated exposure control was also  utilized to decrease patient radiation dose.     TECHNIQUE: Serial helical tomographic images of the cervical spine were  obtained without the use of intravenous contrast. Sagittal and coronal  reformatted images were also provided.     FINDINGS:     ALIGNMENT AND ADDITIONAL FINDINGS: There is straightening on the  sagittal images that is likely positional. There is mild anterior  subluxation of C3 compared to C4, degenerative. There is a corticated  bone fragment involving the left-sided facet joint at C3-4 that may be  an old injury.     BONES: There is no evidence of acute fracture. Vertebral body heights  are maintained.     DISC SPACES:     C2-3: The disc is maintained in height. There is uncinate spurring and  facet arthropathy. There is no  central spinal canal narrowing. There is  moderate to severe bilateral foraminal stenosis.     C3-4: The disc is maintained in height. There is bilateral uncinate  spurring. There is facet arthropathy on the left. There is no central  spinal canal narrowing. There is severe foraminal narrowing on the left  and minimal foraminal narrowing on the right.     C4-5: The disc is maintained in height. There is uncinate spurring. The  facet joints are maintained. There is mild foraminal narrowing  bilaterally.     C5-6: There is severe disc narrowing with spondylitic and uncinate  spurring. The facet joints are maintained. There is severe bilateral  foraminal narrowing.     C6-7: There is mild disc narrowing. There is uncinate spurring. The  facet joints are maintained. There is mild to moderate foraminal  narrowing on the right.     C7-T1: The disc is maintained in height. There is facet arthropathy.  There is no spinal or foraminal stenosis.       Impression:      1. No acute fracture. A corticated bone fragment involving the facet  joint on the left at C3-4 may be an old injury. Straightening on the  sagittal images is likely positional. Minimal anterior subluxation of C3  compared to C4 is felt to be degenerative.  2. Degenerative changes at multiple levels. Please see the above report.  3. There is mild prominence of nasopharyngeal and oropharyngeal lymphoid  tissues. This is symmetric and likely reactive. There are no  pathologically enlarged lymph nodes.     The full report of this exam was immediately signed and available to the  emergency room. The patient is currently in the emergency room.        This report was finalized on 11/14/2022 15:05 by Dr. George Chua MD.        MRI brain:  MRI spine:   CT Head:  CT c-spine:      CT t-spine:  CT l-spine:  X-ray:    I reviewed the patient's new clinical results.  Lab Results (last 24 hours)     ** No results found for the last 24 hours. **        Dayton Glalego,  APRN

## 2022-11-15 ENCOUNTER — TELEPHONE (OUTPATIENT)
Dept: OTOLARYNGOLOGY | Facility: CLINIC | Age: 47
End: 2022-11-15

## 2022-11-15 LAB
QT INTERVAL: 406 MS
QTC INTERVAL: 431 MS

## 2022-11-15 NOTE — TELEPHONE ENCOUNTER
Caller: CHUY BEYER    Relationship: Emergency Contact    Best call back number: [phone]        Who are you requesting to speak with (clinical staff, provider,  specific staff member): EDITH    Do you know the name of the person who called: EDITH    What was the call regarding: REFERRAL     Do you require a callback: YES, HUB ATTEMPTED TO WARM TRANSFER BUT GOT VOICEMAIL. PATIENT REQUESTING A CALL BACK. PATIENT HAVE VERBAL PERMISSION TO SPEAK WITH SPOUSE CHUY BEYER.

## 2022-11-15 NOTE — TELEPHONE ENCOUNTER
Called pt and LM regarding scheduling an appointment for decreased hearing with Stephenie.  Awaiting a call back.

## 2023-01-18 ENCOUNTER — OFFICE VISIT (OUTPATIENT)
Dept: OTOLARYNGOLOGY | Facility: CLINIC | Age: 48
End: 2023-01-18
Payer: COMMERCIAL

## 2023-01-18 VITALS — SYSTOLIC BLOOD PRESSURE: 182 MMHG | DIASTOLIC BLOOD PRESSURE: 108 MMHG | TEMPERATURE: 99.2 F | HEART RATE: 108 BPM

## 2023-01-18 DIAGNOSIS — H69.81 DYSFUNCTION OF RIGHT EUSTACHIAN TUBE: Primary | ICD-10-CM

## 2023-01-18 DIAGNOSIS — H93.11 TINNITUS OF RIGHT EAR: ICD-10-CM

## 2023-01-18 DIAGNOSIS — H91.93 DECREASED HEARING OF BOTH EARS: ICD-10-CM

## 2023-01-18 DIAGNOSIS — H92.11 OTORRHEA OF RIGHT EAR: ICD-10-CM

## 2023-01-18 DIAGNOSIS — I10 HYPERTENSION, UNSPECIFIED TYPE: ICD-10-CM

## 2023-01-18 PROCEDURE — 99214 OFFICE O/P EST MOD 30 MIN: CPT | Performed by: NURSE PRACTITIONER

## 2023-01-18 PROCEDURE — 92504 EAR MICROSCOPY EXAMINATION: CPT | Performed by: NURSE PRACTITIONER

## 2023-01-18 RX ORDER — CIPROFLOXACIN AND DEXAMETHASONE 3; 1 MG/ML; MG/ML
4 SUSPENSION/ DROPS AURICULAR (OTIC) 2 TIMES DAILY
Qty: 7.5 ML | Refills: 0 | Status: SHIPPED | OUTPATIENT
Start: 2023-01-18 | End: 2023-01-25

## 2023-01-18 RX ORDER — AMOXICILLIN AND CLAVULANATE POTASSIUM 875; 125 MG/1; MG/1
1 TABLET, FILM COATED ORAL 2 TIMES DAILY
Qty: 20 TABLET | Refills: 0 | Status: SHIPPED | OUTPATIENT
Start: 2023-01-18 | End: 2023-01-18 | Stop reason: SINTOL

## 2023-01-18 RX ORDER — CLINDAMYCIN HYDROCHLORIDE 300 MG/1
300 CAPSULE ORAL 3 TIMES DAILY
Qty: 30 CAPSULE | Refills: 0 | Status: SHIPPED | OUTPATIENT
Start: 2023-01-18 | End: 2023-01-28

## 2023-01-18 NOTE — PROGRESS NOTES
JORDIN Rojo  W ENT [facility] EAR NOSE & THROAT  [address], [address]  [facility] [address]  Fax [phone]  Phone [phone]      Visit Type: NEW PATIENT   Chief Complaint   Patient presents with   • Decreased hearing     Been having decreased hearing over years.  Pt also has ringing in his ears        HPI  Dayton Wheeler is a 47 y.o. male who presents for evaluation of decreased hearing.  His symptoms are localized to the right>left ears.  His symptoms have been present for many years but have worsened recently.  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  He also reports a history of frequent cerumen accumulation requiring ear cleanings 2-3 times per year.  His last ear cleaning was > 6 years ago.  He complains of right sided high-pitched tinnitus.  It is constant.  He also reports bloody otorrhea from the left ear in December.  This resolved spontaneously after 1 day.  He denies any otalgia.    It is also noted that he has had consistently high blood pressures.  At his last ER visit in November, his blood pressure was 165/113.  His blood pressure is again elevated today.  He does not have an upcoming appointment with his PCP.    Past Medical History:   Diagnosis Date   • Depression          History reviewed. No pertinent surgical history.    Family History: His family history is not on file.     Social History: He  reports that he has never smoked. He has never used smokeless tobacco. He reports current alcohol use. He reports that he does not currently use drugs.    Home Medications:  ciprofloxacin-dexamethasone and clindamycin    Allergies:  He has No Known Allergies.       Vital Signs:   Temp:  [99.2 °F (37.3 °C)] 99.2 °F (37.3 °C)  Heart Rate:  [108] 108  BP: (160-182)/(100-108) 182/108  ENT Physical Exam  Constitutional  Appearance: patient appears well-developed and well-nourished, patient is  cooperative;  Communication/Voice: communication appropriate for developmental age; vocal quality normal;  Head and Face  Appearance: head appears normal and face appears atraumatic;  Ear  Auricles: right auricle normal; left auricle normal;  External Mastoids: right external mastoid normal; left external mastoid normal;  Ear comments: See procedure note.  Nose  Nose comments: Refuses intranasal exam  Respiratory  Inspection: breathing unlabored;  Neurovestibular  Mental Status: alert and oriented;       Procedure Note    Anesthesia: none    Procedure:  Binocular ear microscopy    Procedure Details:    The patient was placed supine on the procedure table. Using a speculum, the ear was examined with a microscope. The following findings were noted:    Findings:   Right EAC with cloudy yellow otorrhea.  This was suctioned under microscopy for exam.  The right tympanic membrane appears to be dull and thickened.  Left EAC with mild amount of cerumen.  I attempted to remove this under microscopy, but stopped due to patient tolerance of procedure.  The left tympanic membrane appears to be intact and healthy without erythema or effusion    Condition:  Stable.  Patient tolerated procedure well.    Complications:  None      Result Review    RESULTS REVIEW    I have reviewed the patients old records in the chart.    Assessment & Plan    Diagnoses and all orders for this visit:    1. Dysfunction of right eustachian tube (Primary)  -     Comprehensive Hearing Test; Future    2. Otorrhea of right ear  -     Comprehensive Hearing Test; Future    3. Decreased hearing of both ears  -     Comprehensive Hearing Test; Future    4. Tinnitus of right ear  -     Comprehensive Hearing Test; Future    5. Hypertension, unspecified type    Other orders  -     Discontinue: amoxicillin-clavulanate (AUGMENTIN) 875-125 MG per tablet; Take 1 tablet by mouth 2 (Two) Times a Day for 10 days.  Dispense: 20 tablet; Refill: 0  -      ciprofloxacin-dexamethasone (CIPRODEX) 0.3-0.1 % otic suspension; Administer 4 drops to the right ear 2 (Two) Times a Day for 7 days.  Dispense: 7.5 mL; Refill: 0  -     clindamycin (CLEOCIN) 300 MG capsule; Take 1 capsule by mouth 3 (Three) Times a Day for 10 days.  Dispense: 30 capsule; Refill: 0       We will go ahead and start Ciprodex drops to the right ear.  The patient is unable to tolerate Augmentin.  He states he begins to smell like the antibiotic and this makes him sick.  We will start clindamycin.  I have also recommended starting Flonase nasal spray.  However, the patient refuses and states he has difficulty tolerating nasal sprays.  We will follow-up with audiogram.  We have also contacted his PCP.  We have made an appointment for him tomorrow with his PCP regarding his hypertension.  He was instructed to call or return should any problems arise prior to next office visit.    Return in about 4 weeks (around 2/15/2023), or if symptoms worsen or fail to improve, for Recheck, With Audio.      Stephenie Baeza, APRN   01/18/23  18:36 CST

## 2023-03-01 ENCOUNTER — OFFICE VISIT (OUTPATIENT)
Dept: OTOLARYNGOLOGY | Facility: CLINIC | Age: 48
End: 2023-03-01
Payer: COMMERCIAL

## 2023-03-01 ENCOUNTER — PROCEDURE VISIT (OUTPATIENT)
Dept: OTOLARYNGOLOGY | Facility: CLINIC | Age: 48
End: 2023-03-01
Payer: COMMERCIAL

## 2023-03-01 VITALS
OXYGEN SATURATION: 98 % | SYSTOLIC BLOOD PRESSURE: 158 MMHG | HEART RATE: 65 BPM | DIASTOLIC BLOOD PRESSURE: 94 MMHG | TEMPERATURE: 97 F

## 2023-03-01 DIAGNOSIS — H90.A22 SENSORINEURAL HEARING LOSS (SNHL) OF LEFT EAR WITH RESTRICTED HEARING OF RIGHT EAR: ICD-10-CM

## 2023-03-01 DIAGNOSIS — H90.A11 CONDUCTIVE HEARING LOSS OF RIGHT EAR WITH RESTRICTED HEARING OF LEFT EAR: ICD-10-CM

## 2023-03-01 DIAGNOSIS — H69.81 DYSFUNCTION OF RIGHT EUSTACHIAN TUBE: Primary | ICD-10-CM

## 2023-03-01 DIAGNOSIS — H93.11 TINNITUS OF RIGHT EAR: ICD-10-CM

## 2023-03-01 PROCEDURE — 1160F RVW MEDS BY RX/DR IN RCRD: CPT | Performed by: NURSE PRACTITIONER

## 2023-03-01 PROCEDURE — 92557 COMPREHENSIVE HEARING TEST: CPT

## 2023-03-01 PROCEDURE — 92567 TYMPANOMETRY: CPT

## 2023-03-01 PROCEDURE — 1159F MED LIST DOCD IN RCRD: CPT | Performed by: NURSE PRACTITIONER

## 2023-03-01 PROCEDURE — 99213 OFFICE O/P EST LOW 20 MIN: CPT | Performed by: NURSE PRACTITIONER

## 2023-03-01 NOTE — PROGRESS NOTES
AUDIOMETRIC EVALUATION      Name:  Dayton Wheeler  :  1975  Age:  47 y.o.  Date of Evaluation:  2023       History:  Mr. Wheeler is seen today for a hearing evaluation due to decreased hearing sensitivity (right worse then left) and right-sided tinnitus at the request of JORDIN Leyva. At his previous ENT appointment on 2023 right-sided otorrhea was noted. Treatment to date includes eardrops.    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  Family history of hearing loss: Unknown  Head trauma requiring hospital stay: No  Chemotherapy: No  Other significant history: High BP    **Case history obtained in office and through EMR system      EVALUATION:          RESULTS:    Otoscopic Evaluation:  Right: Abnormal TM Appearance  Left: Moderate Cerumen    Tympanometry (226 Hz):  Right: Unable to Obtain Seal  Left: Type A   **Artifact noted during left tympanometry tracing    Pure Tone Audiometry:    Right: Moderate (250 Hz) rising to essentially mild (500 Hz - 8000 Hz) conductive hearing loss  Left: Hearing within normal limits (250 Hz - 2000 Hz) sloping to a mild (3000 Hz - 4000 Hz) sensorineural hearing loss rising to hearing within normal limits (6000 Hz - 8000 Hz)  Asymmetry: Yes, with the right ear poorer; BC is symmetric    Speech Audiometry:   Right: Speech Reception Threshold (SRT) was obtained at 30 dB HL  Word Recognition scores - Excellent (100)% using NU-6 List 3A with 10 words  Left: Speech Reception Threshold (SRT) was obtained at 10 dB HL  Word Recognition scores - Excellent (100)% using NU-6 List 4A with 10 words  SRT/PTA in good agreement.    IMPRESSIONS:  Tympanometry showed normal middle ear pressure and static  compliance, for the left ear.  Unable to obtain seal, for the right ear.  Pure tone thresholds for the right ear show a moderate to essentially mild conductive hearing loss, suggesting abnormal outer/middle ear function and normal cochlear/retrocochlear function.   Pure tone thresholds for the left ear show hearing within normal limits to a mild sensorineural hearing loss, suggesting normal outer/middle ear function and abnormal cochlear/retrocochlear function.   Patient was counseled with regard to the findings.    Diagnosis:  1. Dysfunction of right eustachian tube    2. Conductive hearing loss of right ear with restricted hearing of left ear    3. Sensorineural hearing loss (SNHL) of left ear with restricted hearing of right ear    4. Tinnitus of right ear         RECOMMENDATIONS/PLAN:  1. Follow-up recommendations per JORDIN Leyva.  2. Practice good communication strategies to assist with everyday listening (eye contact with speakers, reduce background noise, encourage others to communicate clearly and slowly).  3. Tinnitus management strategies. Consider use of amplification, a white noise machine, low level TV/radio, or fan at night to help mask the tinnitus. It is also recommended to avoid caffeine, alcohol, tobacco, salt, high dose NSAIDs, and to increase hydration. Additional resources: Resound Relief alpesh and American Tinnitus Association (www.dung.org).  4. Consistent utilization of hearing protection devices in noisy environments.  5. Repeat hearing evaluation after medical intervention.  6. Repeat hearing evaluation if changes in hearing are noted or concerns arise.  7. Discussed results and recommendations with patient. Questions were addressed and the patient was encouraged to contact our department should concerns arise.          Yajaira Crenshaw, CRIS-A, F-AAA  Licensed Audiologist

## 2023-03-01 NOTE — PROGRESS NOTES
JORDIN Rojo  W ENT [facility] EAR NOSE & THROAT  [address], [address]  [facility] [address]  Fax [phone]  Phone [phone]      Visit Type: FOLLOW UP   Chief Complaint   Patient presents with   • Hearing Loss     Follow up on hearing issues with audio        HPI  Dayton Wheeler is a 47 y.o. male who presents for follow-up evaluation of decreased hearing.  His symptoms are localized to the right>left ears.  His symptoms have been present for many years, but have worsened recently.  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  He also reports a history of frequent cerumen accumulation requiring ear cleanings 2-3 times per year.  His last ear cleaning was > 6 years ago.  He complains of right sided high-pitched tinnitus.  It is constant.  He also reports bloody otorrhea from the left ear in December.  This resolved spontaneously after 1 day.  He denies any otalgia.  At his last visit on 1/18/2023, he was found to have right otorrhea and his right tympanic membrane appeared to be dull and thickened.  He was treated with Ciprodex and clindamycin.  The patient refuses nasal sprays and reports he has difficulty tolerating them.      Past Medical History:   Diagnosis Date   • Depression          History reviewed. No pertinent surgical history.    Family History: His family history is not on file.     Social History: He  reports that he has never smoked. He has never used smokeless tobacco. He reports current alcohol use. He reports that he does not currently use drugs.    Home Medications:       Allergies:  He has No Known Allergies.       Vital Signs:     /94   Pulse 65   Temp 97 °F (36.1 °C) (Temporal)   SpO2 98%     ENT Physical Exam  Constitutional  Appearance: patient appears well-developed and well-nourished, patient is cooperative;  Communication/Voice: communication appropriate for developmental age; vocal  quality normal;  Head and Face  Appearance: head appears normal and face appears atraumatic;  Ear  Auricles: right auricle normal; left auricle normal;  External Mastoids: right external mastoid normal; left external mastoid normal;  Ear comments: Right tympanic membrane is intact and dull with effusion.  No visible cholesteatoma  Mild amount of cerumen present to the left EAC.  The left tympanic membrane is partially visible but appears to be intact and healthy without erythema or effusion  Nose  Nose comments: Refuses intranasal exam  Respiratory  Inspection: breathing unlabored;  Neurovestibular  Mental Status: alert and oriented;         Result Review    RESULTS REVIEW    I have reviewed the patients old records in the chart.       Name:  Dayton Wheeler  :  1975  Age:  47 y.o.  Date of Evaluation:  2023         History:  Mr. Wheeler is seen today for a hearing evaluation due to decreased hearing sensitivity (right worse then left) and right-sided tinnitus at the request of JORDIN Leyva. At his previous ENT appointment on 2023 right-sided otorrhea was noted. Treatment to date includes eardrops.     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  Family history of hearing loss: Unknown  Head trauma requiring hospital stay: No  Chemotherapy: No  Other significant history: High BP     **Case history obtained in office and through EMR system        EVALUATION:            RESULTS:     Otoscopic Evaluation:  Right: Abnormal TM Appearance  Left: Moderate Cerumen     Tympanometry (226 Hz):  Right: Unable to Obtain Seal  Left: Type A   **Artifact noted during left tympanometry tracing     Pure Tone Audiometry:    Right: Moderate (250  Hz) rising to essentially mild (500 Hz - 8000 Hz) conductive hearing loss  Left: Hearing within normal limits (250 Hz - 2000 Hz) sloping to a mild (3000 Hz - 4000 Hz) sensorineural hearing loss rising to hearing within normal limits (6000 Hz - 8000 Hz)  Asymmetry: Yes, with the right ear poorer; BC is symmetric     Speech Audiometry:   Right: Speech Reception Threshold (SRT) was obtained at 30 dB HL  Word Recognition scores - Excellent (100)% using NU-6 List 3A with 10 words  Left: Speech Reception Threshold (SRT) was obtained at 10 dB HL  Word Recognition scores - Excellent (100)% using NU-6 List 4A with 10 words  SRT/PTA in good agreement.     IMPRESSIONS:  Tympanometry showed normal middle ear pressure and static compliance, for the left ear.  Unable to obtain seal, for the right ear.  Pure tone thresholds for the right ear show a moderate to essentially mild conductive hearing loss, suggesting abnormal outer/middle ear function and normal cochlear/retrocochlear function.   Pure tone thresholds for the left ear show hearing within normal limits to a mild sensorineural hearing loss, suggesting normal outer/middle ear function and abnormal cochlear/retrocochlear function.   Patient was counseled with regard to the findings.     Diagnosis:  1. Dysfunction of right eustachian tube    2. Conductive hearing loss of right ear with restricted hearing of left ear    3. Sensorineural hearing loss (SNHL) of left ear with restricted hearing of right ear    4. Tinnitus of right ear          RECOMMENDATIONS/PLAN:  1. Follow-up recommendations per JORDIN Leyva.  2. Practice good communication strategies to assist with everyday listening (eye contact with speakers, reduce background noise, encourage others to communicate clearly and slowly).  3. Tinnitus management strategies. Consider use of amplification, a white noise machine, low level TV/radio, or fan at night to help mask the tinnitus. It is also recommended to avoid  caffeine, alcohol, tobacco, salt, high dose NSAIDs, and to increase hydration. Additional resources: Resound Relief alpesh and American Tinnitus Association (www.dung.org).  4. Consistent utilization of hearing protection devices in noisy environments.  5. Repeat hearing evaluation after medical intervention.  6. Repeat hearing evaluation if changes in hearing are noted or concerns arise.  7. Discussed results and recommendations with patient. Questions were addressed and the patient was encouraged to contact our department should concerns arise.              Yajaira Crenshaw, CCC-A, F-AAA  Licensed Audiologist        Assessment & Plan    Diagnoses and all orders for this visit:    1. Dysfunction of right eustachian tube (Primary)    2. Conductive hearing loss of right ear with restricted hearing of left ear  -     CT Temporal Bones With & Without Contrast    3. Sensorineural hearing loss (SNHL) of left ear with restricted hearing of right ear    4. Tinnitus of right ear         Audiogram was reviewed and discussed with the patient.  I have recommended right myringotomy tube insertion with left exam under anesthesia and nasal endoscopy.  The patient declines, but agrees to consider this option.  Risks, benefits, alternatives, and complications were discussed.  He does agree to obtain a CT scan of the temporal bones.  He continues to refuse nasal sprays because he does not tolerate these well.  Use hearing protection in loud noise situations.  He was instructed to call or return should any problems arise prior to next office visit.    Return in about 6 weeks (around 4/12/2023), or if symptoms worsen or fail to improve, for Recheck.      JORDIN Rojo

## 2025-07-10 NOTE — PROGRESS NOTES
extremity exam: No direct tenderness to palpation.  He does have AC joint prominence deformity noted.  He is able to achieve full range of motion without limitation or difficulty.  5 out of 5 strength.  Mildly positive crossarm.  Joints appear stable.  Neurovascular intact.  Skin intact without signs of infection.    Radiology:   XR SHOULDER LEFT (MIN 2 VIEWS)  Result Date: 7/15/2025  AP Internal, AP external rotation, Scapular Y, and axillary lateral views of the left shoulder were obtained in the office on today's visit, reviewed and interpreted by me.   Imaging quality is adequate for review.There are no acute fractures or dislocations present, bone and tissue quality are normal, no significant signs of osteoarthritis. Evidence of old AC joint injury with fragmentation and prominence.     XR SHOULDER RIGHT (MIN 2 VIEWS)  Result Date: 7/15/2025  AP Internal, AP external rotation, Scapular Y, and axillary lateral views of the right shoulder were obtained in the office on today's visit, reviewed and interpreted by me.   Imaging quality is adequate for review. There are no fractures or dislocations present, bone and tissue quality are normal, no significant signs of glenohumeral osteoarthritis. AC arthropathy with osteophyte noted.        Procedure:  SHOULDER INJECTION: Risks/benefits were explained and verbal consent was given. Under sterile conditions the skin was cleansed with Chloraprep and alcohol. Using sterile conditions 2 cc of 1% Lidocaine without epinephrine, 2 cc of Ropivacaine, and 40 mg of Kenalog was injected into the subacromial space of the laterality: right using a 23 gauge 1-1/2 inch needle. Needle was withdrawn. Hemostasis was achieved. Sterile dressing was applied in the form of an adhesive bandage. The patient tolerated the procedure well.     Assessment:   1. Pain in joint of right shoulder  -     XR SHOULDER RIGHT (MIN 2 VIEWS); Future  -     triamcinolone acetonide (KENALOG-40) injection 40

## 2025-07-15 ENCOUNTER — OFFICE VISIT (OUTPATIENT)
Age: 50
End: 2025-07-15

## 2025-07-15 VITALS — HEIGHT: 70 IN | WEIGHT: 181 LBS | BODY MASS INDEX: 25.91 KG/M2

## 2025-07-15 DIAGNOSIS — G89.29 CHRONIC RIGHT SHOULDER PAIN: ICD-10-CM

## 2025-07-15 DIAGNOSIS — M25.511 PAIN IN JOINT OF RIGHT SHOULDER: Primary | ICD-10-CM

## 2025-07-15 DIAGNOSIS — Z82.69: ICD-10-CM

## 2025-07-15 DIAGNOSIS — M25.512 PAIN IN JOINT OF LEFT SHOULDER: ICD-10-CM

## 2025-07-15 DIAGNOSIS — S49.92XA INJURY OF LEFT ACROMIOCLAVICULAR JOINT, INITIAL ENCOUNTER: ICD-10-CM

## 2025-07-15 DIAGNOSIS — M25.511 CHRONIC RIGHT SHOULDER PAIN: ICD-10-CM

## 2025-07-15 RX ORDER — LIDOCAINE HYDROCHLORIDE 10 MG/ML
2 INJECTION, SOLUTION INFILTRATION; PERINEURAL ONCE
Status: COMPLETED | OUTPATIENT
Start: 2025-07-15 | End: 2025-07-15

## 2025-07-15 RX ORDER — ROPIVACAINE HYDROCHLORIDE 5 MG/ML
2 INJECTION, SOLUTION EPIDURAL; INFILTRATION; PERINEURAL ONCE
Status: COMPLETED | OUTPATIENT
Start: 2025-07-15 | End: 2025-07-15

## 2025-07-15 RX ORDER — TRIAMCINOLONE ACETONIDE 40 MG/ML
40 INJECTION, SUSPENSION INTRA-ARTICULAR; INTRAMUSCULAR ONCE
Status: COMPLETED | OUTPATIENT
Start: 2025-07-15 | End: 2025-07-15

## 2025-07-15 RX ADMIN — ROPIVACAINE HYDROCHLORIDE 2 ML: 5 INJECTION, SOLUTION EPIDURAL; INFILTRATION; PERINEURAL at 10:17

## 2025-07-15 RX ADMIN — LIDOCAINE HYDROCHLORIDE 2 ML: 10 INJECTION, SOLUTION INFILTRATION; PERINEURAL at 10:16

## 2025-07-15 RX ADMIN — TRIAMCINOLONE ACETONIDE 40 MG: 40 INJECTION, SUSPENSION INTRA-ARTICULAR; INTRAMUSCULAR at 10:17
